# Patient Record
Sex: FEMALE | Race: WHITE
[De-identification: names, ages, dates, MRNs, and addresses within clinical notes are randomized per-mention and may not be internally consistent; named-entity substitution may affect disease eponyms.]

---

## 2020-11-27 ENCOUNTER — HOSPITAL ENCOUNTER (INPATIENT)
Dept: HOSPITAL 47 - EC | Age: 75
LOS: 3 days | DRG: 208 | End: 2020-11-30
Attending: INTERNAL MEDICINE | Admitting: INTERNAL MEDICINE
Payer: MEDICARE

## 2020-11-27 VITALS — BODY MASS INDEX: 45.5 KG/M2

## 2020-11-27 DIAGNOSIS — J44.0: ICD-10-CM

## 2020-11-27 DIAGNOSIS — I50.9: ICD-10-CM

## 2020-11-27 DIAGNOSIS — Z79.890: ICD-10-CM

## 2020-11-27 DIAGNOSIS — E66.01: ICD-10-CM

## 2020-11-27 DIAGNOSIS — Z51.5: ICD-10-CM

## 2020-11-27 DIAGNOSIS — I11.0: ICD-10-CM

## 2020-11-27 DIAGNOSIS — J12.89: ICD-10-CM

## 2020-11-27 DIAGNOSIS — J96.01: ICD-10-CM

## 2020-11-27 DIAGNOSIS — Z66: ICD-10-CM

## 2020-11-27 DIAGNOSIS — E78.5: ICD-10-CM

## 2020-11-27 DIAGNOSIS — Z79.82: ICD-10-CM

## 2020-11-27 DIAGNOSIS — Z79.899: ICD-10-CM

## 2020-11-27 DIAGNOSIS — U07.1: Primary | ICD-10-CM

## 2020-11-27 LAB
CO2 BLDA-SCNC: 28 MMOL/L (ref 19–24)
GLUCOSE BLD-MCNC: 333 MG/DL (ref 75–99)
HCO3 BLDA-SCNC: 27 MMOL/L (ref 21–25)
PCO2 BLDA: 42 MMHG (ref 35–45)
PH BLDA: 7.42 [PH] (ref 7.35–7.45)
PO2 BLDA: 97 MMHG (ref 83–108)

## 2020-11-27 PROCEDURE — 87186 SC STD MICRODIL/AGAR DIL: CPT

## 2020-11-27 PROCEDURE — 94003 VENT MGMT INPAT SUBQ DAY: CPT

## 2020-11-27 PROCEDURE — 71045 X-RAY EXAM CHEST 1 VIEW: CPT

## 2020-11-27 PROCEDURE — 99291 CRITICAL CARE FIRST HOUR: CPT

## 2020-11-27 PROCEDURE — 87205 SMEAR GRAM STAIN: CPT

## 2020-11-27 PROCEDURE — 36600 WITHDRAWAL OF ARTERIAL BLOOD: CPT

## 2020-11-27 PROCEDURE — 87077 CULTURE AEROBIC IDENTIFY: CPT

## 2020-11-27 PROCEDURE — 87070 CULTURE OTHR SPECIMN AEROBIC: CPT

## 2020-11-27 PROCEDURE — 94002 VENT MGMT INPAT INIT DAY: CPT

## 2020-11-27 PROCEDURE — 82553 CREATINE MB FRACTION: CPT

## 2020-11-27 PROCEDURE — 85379 FIBRIN DEGRADATION QUANT: CPT

## 2020-11-27 PROCEDURE — 82728 ASSAY OF FERRITIN: CPT

## 2020-11-27 PROCEDURE — 5A09357 ASSISTANCE WITH RESPIRATORY VENTILATION, LESS THAN 24 CONSECUTIVE HOURS, CONTINUOUS POSITIVE AIRWAY PRESSURE: ICD-10-PCS

## 2020-11-27 PROCEDURE — 80053 COMPREHEN METABOLIC PANEL: CPT

## 2020-11-27 PROCEDURE — 83615 LACTATE (LD) (LDH) ENZYME: CPT

## 2020-11-27 PROCEDURE — 86140 C-REACTIVE PROTEIN: CPT

## 2020-11-27 PROCEDURE — 85025 COMPLETE CBC W/AUTO DIFF WBC: CPT

## 2020-11-27 PROCEDURE — 82805 BLOOD GASES W/O2 SATURATION: CPT

## 2020-11-27 PROCEDURE — 93005 ELECTROCARDIOGRAM TRACING: CPT

## 2020-11-27 PROCEDURE — 51702 INSERT TEMP BLADDER CATH: CPT

## 2020-11-27 PROCEDURE — 83036 HEMOGLOBIN GLYCOSYLATED A1C: CPT

## 2020-11-27 PROCEDURE — 94660 CPAP INITIATION&MGMT: CPT

## 2020-11-27 RX ADMIN — DEXAMETHASONE SODIUM PHOSPHATE SCH MG: 4 INJECTION, SOLUTION INTRAMUSCULAR; INTRAVENOUS at 21:24

## 2020-11-27 RX ADMIN — ENOXAPARIN SODIUM SCH MG: 40 INJECTION SUBCUTANEOUS at 21:23

## 2020-11-27 RX ADMIN — CEFAZOLIN SCH MLS/HR: 330 INJECTION, POWDER, FOR SOLUTION INTRAMUSCULAR; INTRAVENOUS at 21:24

## 2020-11-27 NOTE — ED
General Adult HPI





- General


Chief complaint: Shortness of Breath


Stated complaint: + COVID,SOB


Time Seen by Provider: 20 18:18


Source: patient, RN/MD, EMS


Mode of arrival: EMS


Limitations: no limitations





- History of Present Illness


Initial comments: 


Dictation was produced using dragon dictation software. please excuse any 

grammatical, word or spelling errors. 





This patient was cared for during a federal and state declared state of 

emergency secondary to Covid 19





Chief Complaint: 75-year-old female sent in from [facility] for 

hypoxic respiratory failure secondary to Covid 19





History of Present Illness: 75-year-old female she was transferred from 

[facility] by Dr. Alexis.  Patient was in ER to ER transfer.  

According to transferring ER physician patient was sent here for higher level of

care.  She is one other residents at their local nursing homes.  She is 

transferred to emergency department for low saturations in the 70s and 80 

percents.  Patient is required supplemental oxygen.  At the emergency room 

patient was placed on BiPAP.  She was given 1 dose of remdesivir.  EMS reports 

that patient seemed a little wheezing and she was given a breathing treatment 

for low saturations.  The transfer patient was with CPAP.  She did have low 

oxygens.  Patient does complain of some mild epigastric pain.








The ROS documented in this emergency department record has been reviewed and 

confirmed by me.  Those systems with pertinent positive or negative responses 

have been documented in the HPI.  All other systems are other negative and/or 

noncontributory.








PHYSICAL EXAM:


General Impression: Alert and oriented x3, mildly dyspneic, BiPAP in place


HEENT: Normocephalic atraumatic, extra-ocular movements intact, pupils equal and

reactive to light bilaterally, mucous membranes moist.


Cardiovascular: Heart regular rate and rhythm


Chest: Able to complete full sentences, no retractions, no tachypnea


Abdomen: abdomen soft, non-tender, non-distended, no organomegaly


Musculoskeletal: Pulses present and equal in all extremities, no peripheral 

edema


Motor:  no focal deficits noted


Neurological: CN II-XII grossly intact, no focal motor or sensory deficits noted


Skin: Intact with no visualized rashes


Psych: Normal affect and mood





ED course: 75-year-old female presents with Covid 19 pneumonia.  Patient sent 

here for hypoxic respiratory failure possible ICU admission.  Patient is well-

appearing at bedside however she does appear to be hypoxic.  She is in the high 

80s low 90s with bilevel positive airway ventilation. 


Transfer documentation reviewed reviewed.  ABGs were ordered.  PO2 is 43, bicarb

26, CO2 of 28, pH of 7.47.  Chest x-ray shows CHF and possible right lower lobe 

pneumonia and right pleural effusion.  This is discussed that she does want to 

set patient's care.  Patient will be admitted to intensive care unit.  Case is 

discussed with Dr. Conte who is willing for patient disposition to intensive 

care unit.





EKG interpretation: Ventricular rate 80, normal sinus rhythm,.  188, , 

. No MA prolongation, no QTC prolongation, no ST or T-wave changes noted.










- Related Data


                                    Allergies











Allergy/AdvReac Type Severity Reaction Status Date / Time


 


No Known Allergies Allergy   Verified 20 18:17














Review of Systems


ROS Statement: 


Those systems with pertinent positive or pertinent negative responses have been 

documented in the HPI.





ROS Other: All systems not noted in ROS Statement are negative.





Past Medical History


Past Medical History: COPD, Hyperlipidemia, Hypertension


History of Any Multi-Drug Resistant Organisms: None Reported


Past Surgical History:  Section


Past Psychological History: No Psychological Hx Reported


Smoking Status: Never smoker


Past Alcohol Use History: None Reported


Past Drug Use History: None Reported





General Exam


Limitations: no limitations





Course


                                   Vital Signs











  20





  18:17 18:25


 


Temperature 98.6 F 98.6 F


 


Pulse Rate 79 76


 


Respiratory 36 H 24





Rate  


 


Blood Pressure 120/68 


 


O2 Sat by Pulse 88 L 91 L





Oximetry  














Medical Decision Making





- Lab Data


                                   Lab Results











  20 Range/Units





  18:28 


 


Sample Site  left radial  


 


ABG pH  7.42  (7.35-7.45)  


 


ABG pCO2  42  (35-45)  mmHg


 


ABG pO2  97  ()  mmHg


 


ABG HCO3  27 H  (21-25)  mmol/L


 


ABG Total CO2  28 H  (19-24)  mmol/L


 


ABG O2 Saturation  96.5  (94-97)  %


 


ABG Base Excess  2.7  mmol/L


 


Blair Test  Yes  


 


FiO2  100  %














Critical Care Time


Critical Care Time: Yes


Total Critical Care Time: 33





Disposition


Clinical Impression: 


 Respiratory failure, COVID-19





Disposition: ADMITTED AS IP TO THIS [facility]


Condition: Critical


Referrals: 


Nonstaff,Physician [REFERRING] - 1-2 days


Decision Time: 19:16

## 2020-11-28 LAB
ALBUMIN SERPL-MCNC: 3.3 G/DL (ref 3.5–5)
ALP SERPL-CCNC: 77 U/L (ref 38–126)
ALT SERPL-CCNC: 12 U/L (ref 4–34)
ANION GAP SERPL CALC-SCNC: 9 MMOL/L
AST SERPL-CCNC: 37 U/L (ref 14–36)
BASOPHILS # BLD AUTO: 0 K/UL (ref 0–0.2)
BASOPHILS NFR BLD AUTO: 1 %
BUN SERPL-SCNC: 31 MG/DL (ref 7–17)
CALCIUM SPEC-MCNC: 8.7 MG/DL (ref 8.4–10.2)
CHLORIDE SERPL-SCNC: 101 MMOL/L (ref 98–107)
CO2 BLDA-SCNC: 27 MMOL/L (ref 19–24)
CO2 BLDA-SCNC: 29 MMOL/L (ref 19–24)
CO2 SERPL-SCNC: 25 MMOL/L (ref 22–30)
EOSINOPHIL # BLD AUTO: 0 K/UL (ref 0–0.7)
EOSINOPHIL NFR BLD AUTO: 1 %
ERYTHROCYTE [DISTWIDTH] IN BLOOD BY AUTOMATED COUNT: 3.97 M/UL (ref 3.8–5.4)
ERYTHROCYTE [DISTWIDTH] IN BLOOD: 12.5 % (ref 11.5–15.5)
FERRITIN SERPL-MCNC: 768 NG/ML (ref 10–291)
GLUCOSE BLD-MCNC: 210 MG/DL (ref 75–99)
GLUCOSE BLD-MCNC: 250 MG/DL (ref 75–99)
GLUCOSE BLD-MCNC: 260 MG/DL (ref 75–99)
GLUCOSE SERPL-MCNC: 276 MG/DL (ref 74–99)
HBA1C MFR BLD: 7.9 % (ref 4–6)
HCO3 BLDA-SCNC: 26 MMOL/L (ref 21–25)
HCO3 BLDA-SCNC: 28 MMOL/L (ref 21–25)
HCT VFR BLD AUTO: 35.7 % (ref 34–46)
HGB BLD-MCNC: 12 GM/DL (ref 11.4–16)
LDH SPEC-CCNC: 1171 U/L (ref 313–618)
LYMPHOCYTES # SPEC AUTO: 0.8 K/UL (ref 1–4.8)
LYMPHOCYTES NFR SPEC AUTO: 16 %
MCH RBC QN AUTO: 30.3 PG (ref 25–35)
MCHC RBC AUTO-ENTMCNC: 33.6 G/DL (ref 31–37)
MCV RBC AUTO: 90 FL (ref 80–100)
MONOCYTES # BLD AUTO: 0.3 K/UL (ref 0–1)
MONOCYTES NFR BLD AUTO: 6 %
NEUTROPHILS # BLD AUTO: 3.7 K/UL (ref 1.3–7.7)
NEUTROPHILS NFR BLD AUTO: 76 %
PCO2 BLDA: 37 MMHG (ref 35–45)
PCO2 BLDA: 43 MMHG (ref 35–45)
PH BLDA: 7.42 [PH] (ref 7.35–7.45)
PH BLDA: 7.46 [PH] (ref 7.35–7.45)
PLATELET # BLD AUTO: 193 K/UL (ref 150–450)
PO2 BLDA: 207 MMHG (ref 83–108)
PO2 BLDA: 74 MMHG (ref 83–108)
POTASSIUM SERPL-SCNC: 3.9 MMOL/L (ref 3.5–5.1)
PROT SERPL-MCNC: 6.5 G/DL (ref 6.3–8.2)
SODIUM SERPL-SCNC: 135 MMOL/L (ref 137–145)
WBC # BLD AUTO: 4.9 K/UL (ref 3.8–10.6)

## 2020-11-28 PROCEDURE — 02H633Z INSERTION OF INFUSION DEVICE INTO RIGHT ATRIUM, PERCUTANEOUS APPROACH: ICD-10-PCS

## 2020-11-28 PROCEDURE — 5A1945Z RESPIRATORY VENTILATION, 24-96 CONSECUTIVE HOURS: ICD-10-PCS

## 2020-11-28 PROCEDURE — 0DH67UZ INSERTION OF FEEDING DEVICE INTO STOMACH, VIA NATURAL OR ARTIFICIAL OPENING: ICD-10-PCS

## 2020-11-28 PROCEDURE — 0BH17EZ INSERTION OF ENDOTRACHEAL AIRWAY INTO TRACHEA, VIA NATURAL OR ARTIFICIAL OPENING: ICD-10-PCS

## 2020-11-28 PROCEDURE — 4A133B1 MONITORING OF ARTERIAL PRESSURE, PERIPHERAL, PERCUTANEOUS APPROACH: ICD-10-PCS

## 2020-11-28 PROCEDURE — 03HY32Z INSERTION OF MONITORING DEVICE INTO UPPER ARTERY, PERCUTANEOUS APPROACH: ICD-10-PCS

## 2020-11-28 PROCEDURE — 4A133J1 MONITORING OF ARTERIAL PULSE, PERIPHERAL, PERCUTANEOUS APPROACH: ICD-10-PCS

## 2020-11-28 PROCEDURE — 3E0G76Z INTRODUCTION OF NUTRITIONAL SUBSTANCE INTO UPPER GI, VIA NATURAL OR ARTIFICIAL OPENING: ICD-10-PCS

## 2020-11-28 RX ADMIN — CHLORHEXIDINE GLUCONATE SCH ML: 1.2 RINSE ORAL at 20:40

## 2020-11-28 RX ADMIN — ENOXAPARIN SODIUM SCH MG: 40 INJECTION SUBCUTANEOUS at 20:40

## 2020-11-28 RX ADMIN — CHLORHEXIDINE GLUCONATE SCH ML: 1.2 RINSE ORAL at 09:26

## 2020-11-28 RX ADMIN — DEXAMETHASONE SODIUM PHOSPHATE SCH MG: 4 INJECTION, SOLUTION INTRAMUSCULAR; INTRAVENOUS at 20:40

## 2020-11-28 RX ADMIN — PANTOPRAZOLE SODIUM SCH MG: 40 INJECTION, POWDER, FOR SOLUTION INTRAVENOUS at 09:27

## 2020-11-28 RX ADMIN — INSULIN ASPART SCH UNIT: 100 INJECTION, SOLUTION INTRAVENOUS; SUBCUTANEOUS at 18:37

## 2020-11-28 RX ADMIN — SODIUM CHLORIDE SCH MLS/HR: 900 INJECTION, SOLUTION INTRAVENOUS at 19:05

## 2020-11-28 RX ADMIN — INSULIN ASPART SCH: 100 INJECTION, SOLUTION INTRAVENOUS; SUBCUTANEOUS at 17:45

## 2020-11-28 RX ADMIN — NOREPINEPHRINE BITARTRATE SCH MLS/HR: 1 INJECTION, SOLUTION, CONCENTRATE INTRAVENOUS at 10:30

## 2020-11-28 RX ADMIN — CEFAZOLIN SCH: 330 INJECTION, POWDER, FOR SOLUTION INTRAMUSCULAR; INTRAVENOUS at 18:37

## 2020-11-28 RX ADMIN — INSULIN ASPART SCH UNIT: 100 INJECTION, SOLUTION INTRAVENOUS; SUBCUTANEOUS at 06:26

## 2020-11-28 NOTE — XR
EXAMINATION TYPE: XR chest 1V portable

 

DATE OF EXAM: 11/28/2020

 

CLINICAL HISTORY: Central line placement.  

 

TECHNIQUE: Single AP portable semiupright view of the chest is obtained.

 

COMPARISON: Chest x-ray from earlier today an older studies

 

FINDINGS:  New right sided central venous catheter terminates in right atrium. Stable endotracheal an
d orogastric tubes.

 

Persistent elevated right hemidiaphragm with increased opacities bilaterally. No pleural effusion or 
pneumothorax seen. Enlarged cardiomediastinal silhouette redemonstrated. Osseous structures remain de
mineralized.

 

IMPRESSION: New right central venous catheter terminates in right atrium. No pneumothorax noted. Pers
istent low lung volumes and cardiomegaly with bilateral multifocal edema and/or infiltrates.

## 2020-11-28 NOTE — PCN
PROCEDURE NOTE



OPERATIVE REPORT:

Placement of the left brachial arterial line.



PREOPERATIVE DIAGNOSIS:

Acute hypoxic respiratory failure secondary to Covid-19 pneumonitis.



POSTOPERATIVE DIAGNOSIS:

Acute hypoxic respiratory failure secondary to Covid-19 pneumonitis.



ANESTHESIA:

Used none deployed.



PROCEDURE DETAILS:

The left brachial region was prepared in a sterile fashion and drapes applied.  The

left brachial artery was palpated, easily cannulated, a guidewire was placed.  A Cook

catheter was inserted over the guidewire, guidewire was removed.  Good blood flow, good

waveform noted, line was secured using 3.0 silk sutures, and no evidence of any

complications.





MMODL / IJN: [id number] / Job#: [id number]

## 2020-11-28 NOTE — XR
EXAM:

  XR Chest, 1 View

 

CLINICAL HISTORY:

  ITS.REASON XR Reason: Tube placement

 

TECHNIQUE:

  Frontal view of the chest.

 

COMPARISON:

 

11/27/2020

 

IMPRESSION:     

 

ET tube terminates 2.9 cm from the case.

NG tube enters into the stomach but projects off the field of view.

Bilateral lung opacities are again seen.

Subcentimeter nodular densities in the right upper lobe

## 2020-11-28 NOTE — PCN
PROCEDURE NOTE



OPERATIVE REPORT:

Placement of a right subclavian triple-lumen catheter.



PREOPERATIVE DIAGNOSIS:

Acute hypoxic respiratory failure secondary to COVID-19 pneumonitis.



POSTOPERATIVE DIAGNOSIS:

Acute hypoxic respiratory failure secondary to COVID-19 pneumonitis.



ANESTHESIA USED:

2 mL of 1% lidocaine.



PROCEDURE DETAILS:

Patient was placed in a Trendelenburg position, the area of the right subclavian region

was prepared in a sterile fashion and drapes were applied.  Then, the area was locally

anesthetized with lidocaine.  Using the infraclavicular approach, the right subclavian

vein was easily cannulated, a guidewire was placed, the area around the guidewire was

dilated.  Then a triple-lumen catheter was inserted over the guidewire, the guidewire

was removed.  Good blood flow noted in the 3 different ports of the triple-lumen

catheter.  Line was secured using 3.0 silk sutures.  No evidence of any complications.

Chest x-ray showed adequate placement of the right subclavian line and no

complications.





MMODL / IJN: [id number] / Job#: [id number]

## 2020-11-28 NOTE — P.CNPUL
History of Present Illness


Consult date: 20


Requesting physician: Tony E Sheet


Reason for consult: other (Acute hypoxic respiratory failure)


Chief complaint: Shortness of breath


History of present illness: 





This is a 75-year-old female transferred last night from [facility] to

our ER with acute hypoxic respiratory failure, and bilateral pneumonia.  Patient

tested positive for covid 19, and her chest x-ray is classic for Covid 19 

pneumonitis.  Patient was initially on BiPAP, and her initial ABG was extremely 

poor.  Patient was admitted to the ICU, and she Removing her BiPAP.  Hence she 

was intubated and placed on mechanical ventilation.  I saw this patient this 

morning, and she is now on assist control mode of mechanical ventilation, rate 

of 2412 volume is 450 FiO2 is 60% and PEEP of 10.  ABG on 100% FiO2 showed a pO2

of 207 pCO2 of 37 pH of 7.46.  Patient is on propofol at 50 mcg/kg/m, she also 

on fentanyl at 0.5 mcg/kg per hour.  She received 1 dose of remdesivir at 

[facility], I was considering to continue the medication, however 

since her CODE STATUS has been changed, and the daughter is considering possible

comfort care measures, will hold further treatment with remdesivir.  Chest x-ray

showed evidence of diffuse interstitial infiltrates.





Review of Systems


ROS unobtainable: due to endotracheal tube





Past Medical History


Past Medical History: COPD, Hyperlipidemia, Hypertension


History of Any Multi-Drug Resistant Organisms: None Reported


Past Surgical History:  Section


Past Anesthesia/Blood Transfusion Reactions: No Reported Reaction


Smoking Status: Never smoker





Medications and Allergies


                                Home Medications











 Medication  Instructions  Recorded  Confirmed  Type


 


Acetaminophen [Tylenol] 650 mg PO Q6H PRN 20 History


 


Artificial Tears-Hypromellose 1 drops BOTH EYES BID 20 History





[Artificial Tear Drops]    


 


Ascorbic Acid [Vitamin C] 500 mg PO DAILY 20 History


 


Aspirin [Adult Low Dose Aspirin EC] 81 mg PO DAILY 20 History


 


Atorvastatin [Lipitor] 10 mg PO HS 20 History


 


Azithromycin [Zithromax Z-pack (6 See Taper PO AS DIRECTED 20 

History





tabs)]    


 


Carvedilol [Coreg] 12.5 mg PO BID 20 History


 


Cholecalciferol [Vitamin D3 (25 5,000 unit PO DAILY 20 History





Mcg = 1000 Iu)]    


 


DULoxetine HCL [Cymbalta] 60 mg PO DAILY 20 History


 


Dexamethasone 6 mg PO DAILY 20 History


 


Diphenox-Atrop 2.5-0.025 mg 1 tab PO QID PRN 20 History





[Lomotil]    


 


Furosemide [Lasix] 20 mg PO DAILY 20 History


 


Gabapentin [Neurontin] 100 mg PO BID 20 History


 


HYDROcodone/APAP 5-325MG [Norco 1 tab PO QID 20 History





5-325]    


 


LORazepam [Ativan] 1 mg PO Q2H PRN 20 History


 


Levothyroxine Sodium [Synthroid] 50 mcg PO DAILY 20 History


 


Magnesium Hydroxide [Milk of 2,400 mg PO Q48H PRN 20 History





Magnesia]    


 


Mirtazapine [Remeron] 15 mg PO HS 20 History


 


Pantoprazole Sodium [Protonix] 20 mg PO DAILY 20 History


 


Potassium Chloride ER [K-Dur 10] 10 meq PO DAILY 20 History


 


Sennosides-Docusate Sodium 1 tab PO HS 20 History





[Senokot-S]    


 


Zinc 50 mg PO DAILY 20 History


 


lisinopriL [Zestril] 2.5 mg PO DAILY 20 History








                                    Allergies











Allergy/AdvReac Type Severity Reaction Status Date / Time


 


No Known Allergies Allergy   Verified 20 22:15














Physical Exam


Vitals: 


                                   Vital Signs











  Temp Pulse Resp BP Pulse Ox


 


 20 12:00   66  24   91 L


 


 20 11:00   66  24  70/53  87 L


 


 20 10:00   70  24  93/60  92 L


 


 20 09:00   70  24  88/59  92 L


 


 20 08:00   74  24  97/63  91 L


 


 20 07:00   73  24  94/61  90 L


 


 20 06:00   69  25 H  102/66  98


 


 20 05:00   74  24  91/60  99


 


 20 04:00  98.5 F  82  26 H  166/87  77 L


 


 20 03:00   63  43 H  106/89  91 L


 


 20 02:00   69  26 H  147/88  94 L


 


 20 01:00   68  28 H  150/82  94 L


 


 20 00:00   67  36 H  150/82  93 L


 


 20 23:45  98.7 F  71  30 H  166/109  94 L


 


 20 23:00  99.0 F  75  32 H  124/77  98


 


 20 22:00  98.0 F  89  34 H  116/87  95


 


 20 21:30   57 L  28 H  138/84  95


 


 20 21:00   72  30 H  131/77  95


 


 20 19:23   69  30 H  121/89  94 L


 


 20 18:25  98.6 F  76  24   91 L


 


 20 18:17  98.6 F  79  36 H  120/68  88 L








                                Intake and Output











 20





 06:59 14:59 22:59


 


Intake Total 232.584 162.661 


 


Output Total 315 55 


 


Balance -82.416 107.661 


 


Intake:   


 


   60 


 


    Sodium Chloride 0.9% 1, 160 60 





    000 ml @ 20 mls/hr IV .   





    Q24H ELINA Rx#:[id number]   


 


  Intake, IV Titration 72.584 102.661 





  Amount   


 


    Norepinephrine 8 mg In  2.661 





    Sodium Chloride 0.9% 250   





    ml @ 0.05 MCG/KG/MIN 10.   





    643 mls/hr IV .Q24H ELINA   





    Rx#:[id number]   


 


    propofoL 1,000 mg In 72.584 100 





    Empty Bag 1 bag @ Titrate   





    IV .Q0M ELINA Rx#:   





    [id number]   


 


Output:   


 


  Urine 315 55 


 


Other:   


 


  Voiding Method Indwelling Catheter  


 


  Weight 110 kg 110 kg 








                         ABP, PAP, CO, CI - Last 8 Hours











Arterial Blood Pressure        105/59


 


Arterial Blood Pressure        60/37

















Physical Exam: Revealed a 75-year-old female, morbidly obese, intubated and 

mechanically ventilated.


Head: Atraumatic, normocephalic.  Endotracheal tube, orogastric tube are intact.

  Right subclavian central line is noted.


HEENT:[Neck is supple.] [No neck masses.] [No thyromegaly.] [No JVD.]


Chest: Symmetrical chest expansion, crackles and rhonchi noted bilaterally.


Cardiac Exam: [Normal S1 and S2, no S3 gallop, no murmur.]


Abdomen: [Morbidly obese, Soft, nontender,  no megaly, no rebound, no guarding, 

normal bowel sounds.]


Extremities: [No clubbing, no edema, no cyanosis.]  Good pulses bilaterally


Neurological Exam: Could not assess, patient is on propofol and fentanyl.


Psychiatric could not assess 





Results





- Laboratory Findings


CBC and BMP: 


                                 20 05:34





                                 20 05:34


ABG











ABG pH  7.46  (7.35-7.45)  H  20  06:05    


 


ABG pCO2  37 mmHg (35-45)   20  06:05    


 


ABG pO2  207 mmHg ()  H  20  06:05    


 


ABG O2 Saturation  98.7 % (94-97)  H  20  06:05    





PT/INR, D-dimer











D-Dimer  0.75 mg/L FEU (<0.60)  H  20  05:34    








Abnormal lab findings: 


                                  Abnormal Labs











  20





  18:28 23:49 00:35


 


Lymphocytes #   


 


D-Dimer   


 


ABG pH   


 


ABG pO2    74 L


 


ABG HCO3  27 H   28 H


 


ABG Total CO2  28 H   29 H


 


ABG O2 Saturation   


 


Sodium   


 


BUN   


 


Glucose   


 


POC Glucose (mg/dL)   333 H 


 


Hemoglobin A1c   


 


Ferritin   


 


AST   


 


Lactate Dehydrogenase   


 


C-Reactive Protein   


 


Albumin   














  20





  05:34 05:34 05:34


 


Lymphocytes #  0.8 L  


 


D-Dimer   0.75 H 


 


ABG pH   


 


ABG pO2   


 


ABG HCO3   


 


ABG Total CO2   


 


ABG O2 Saturation   


 


Sodium    135 L


 


BUN    31 H


 


Glucose    276 H


 


POC Glucose (mg/dL)   


 


Hemoglobin A1c   


 


Ferritin    768.0 H


 


AST    37 H


 


Lactate Dehydrogenase    1171 H


 


C-Reactive Protein    204.7 H


 


Albumin    3.3 L














  20





  05:34 06:01 06:05


 


Lymphocytes #   


 


D-Dimer   


 


ABG pH    7.46 H


 


ABG pO2    207 H


 


ABG HCO3    26 H


 


ABG Total CO2    27 H


 


ABG O2 Saturation    98.7 H


 


Sodium   


 


BUN   


 


Glucose   


 


POC Glucose (mg/dL)   260 H 


 


Hemoglobin A1c  7.9 H  


 


Ferritin   


 


AST   


 


Lactate Dehydrogenase   


 


C-Reactive Protein   


 


Albumin   














- Diagnostic Findings


Chest x-ray: image reviewed (As noted in HPI.)





Assessment and Plan


Assessment: 





Impression:


Acute hypoxic respiratory failure secondary to Covid 19 pneumonitis.


History of benign essential hypertension.


History of dyslipidemia.


History of underlying COPD, severity of which is not clear.





Recommendation:


Continue ventilatory support.


Continue Covid 19 cocktail.


GI and DVT prophylaxis.


Nutritional support/enteral feeding.


Hemodynamic support if necessary.


Prognosis is definitely guarded.


adjust ventilator settings accordingly based on the next ABG.


Continue propofol and fentanyl for now.


Apparently the nurse taking care of the patient had discussion with her 

daughter, and CODE STATUS has been all along DO NOT RESUSCITATE, daughter is 

considering comfort care measures.


Will recommend the wishes of the daughter , we will proceed with comfort care 

measures if the daughter wishes and if those are true wishes of the patient 

prior to this episode.





Time with Patient: Greater than 30

## 2020-11-29 LAB
ALBUMIN SERPL-MCNC: 3.1 G/DL (ref 3.5–5)
ALP SERPL-CCNC: 75 U/L (ref 38–126)
ALT SERPL-CCNC: 13 U/L (ref 4–34)
ANION GAP SERPL CALC-SCNC: 9 MMOL/L
AST SERPL-CCNC: 31 U/L (ref 14–36)
BASOPHILS # BLD AUTO: 0.1 K/UL (ref 0–0.2)
BASOPHILS NFR BLD AUTO: 1 %
BUN SERPL-SCNC: 40 MG/DL (ref 7–17)
CALCIUM SPEC-MCNC: 8.7 MG/DL (ref 8.4–10.2)
CHLORIDE SERPL-SCNC: 100 MMOL/L (ref 98–107)
CO2 BLDA-SCNC: 27 MMOL/L (ref 19–24)
CO2 SERPL-SCNC: 27 MMOL/L (ref 22–30)
EOSINOPHIL # BLD AUTO: 0 K/UL (ref 0–0.7)
EOSINOPHIL NFR BLD AUTO: 0 %
ERYTHROCYTE [DISTWIDTH] IN BLOOD BY AUTOMATED COUNT: 3.85 M/UL (ref 3.8–5.4)
ERYTHROCYTE [DISTWIDTH] IN BLOOD: 12.5 % (ref 11.5–15.5)
FERRITIN SERPL-MCNC: 635.6 NG/ML (ref 10–291)
GLUCOSE BLD-MCNC: 193 MG/DL (ref 75–99)
GLUCOSE BLD-MCNC: 243 MG/DL (ref 75–99)
GLUCOSE BLD-MCNC: 254 MG/DL (ref 75–99)
GLUCOSE SERPL-MCNC: 243 MG/DL (ref 74–99)
HCO3 BLDA-SCNC: 26 MMOL/L (ref 21–25)
HCT VFR BLD AUTO: 34.1 % (ref 34–46)
HGB BLD-MCNC: 11.6 GM/DL (ref 11.4–16)
LDH SPEC-CCNC: 844 U/L (ref 313–618)
LYMPHOCYTES # SPEC AUTO: 0.6 K/UL (ref 1–4.8)
LYMPHOCYTES NFR SPEC AUTO: 10 %
MCH RBC QN AUTO: 30.2 PG (ref 25–35)
MCHC RBC AUTO-ENTMCNC: 34.1 G/DL (ref 31–37)
MCV RBC AUTO: 88.6 FL (ref 80–100)
MONOCYTES # BLD AUTO: 0.4 K/UL (ref 0–1)
MONOCYTES NFR BLD AUTO: 6 %
NEUTROPHILS # BLD AUTO: 5.2 K/UL (ref 1.3–7.7)
NEUTROPHILS NFR BLD AUTO: 82 %
PCO2 BLDA: 33 MMHG (ref 35–45)
PH BLDA: 7.51 [PH] (ref 7.35–7.45)
PLATELET # BLD AUTO: 204 K/UL (ref 150–450)
PO2 BLDA: 72 MMHG (ref 83–108)
POTASSIUM SERPL-SCNC: 3.5 MMOL/L (ref 3.5–5.1)
PROT SERPL-MCNC: 6.3 G/DL (ref 6.3–8.2)
SODIUM SERPL-SCNC: 136 MMOL/L (ref 137–145)
WBC # BLD AUTO: 6.4 K/UL (ref 3.8–10.6)

## 2020-11-29 RX ADMIN — INSULIN ASPART SCH UNIT: 100 INJECTION, SOLUTION INTRAVENOUS; SUBCUTANEOUS at 06:19

## 2020-11-29 RX ADMIN — CHLORHEXIDINE GLUCONATE SCH ML: 1.2 RINSE ORAL at 08:43

## 2020-11-29 RX ADMIN — INSULIN ASPART SCH UNIT: 100 INJECTION, SOLUTION INTRAVENOUS; SUBCUTANEOUS at 18:13

## 2020-11-29 RX ADMIN — CHLORHEXIDINE GLUCONATE SCH ML: 1.2 RINSE ORAL at 20:59

## 2020-11-29 RX ADMIN — CEFAZOLIN SCH MLS/HR: 330 INJECTION, POWDER, FOR SOLUTION INTRAMUSCULAR; INTRAVENOUS at 20:59

## 2020-11-29 RX ADMIN — POTASSIUM BICARBONATE SCH MEQ: 782 TABLET, EFFERVESCENT ORAL at 08:43

## 2020-11-29 RX ADMIN — POTASSIUM BICARBONATE SCH MEQ: 782 TABLET, EFFERVESCENT ORAL at 06:39

## 2020-11-29 RX ADMIN — ENOXAPARIN SODIUM SCH MG: 40 INJECTION SUBCUTANEOUS at 20:59

## 2020-11-29 RX ADMIN — DEXAMETHASONE SODIUM PHOSPHATE SCH MG: 4 INJECTION, SOLUTION INTRAMUSCULAR; INTRAVENOUS at 20:59

## 2020-11-29 RX ADMIN — NOREPINEPHRINE BITARTRATE SCH: 1 INJECTION, SOLUTION, CONCENTRATE INTRAVENOUS at 13:01

## 2020-11-29 RX ADMIN — SODIUM CHLORIDE SCH MLS/HR: 900 INJECTION, SOLUTION INTRAVENOUS at 08:41

## 2020-11-29 RX ADMIN — INSULIN ASPART SCH UNIT: 100 INJECTION, SOLUTION INTRAVENOUS; SUBCUTANEOUS at 00:00

## 2020-11-29 RX ADMIN — PANTOPRAZOLE SODIUM SCH MG: 40 INJECTION, POWDER, FOR SOLUTION INTRAVENOUS at 08:43

## 2020-11-29 RX ADMIN — INSULIN ASPART SCH UNIT: 100 INJECTION, SOLUTION INTRAVENOUS; SUBCUTANEOUS at 13:02

## 2020-11-29 NOTE — P.PN
Subjective


Progress Note Date: 11/29/20


Principal diagnosis: 





Acute hypoxic respiratory failure secondary to covid19 pneumonitis.





This is a 75-year-old female transferred last night from [facility] to

our ER with acute hypoxic respiratory failure, and bilateral pneumonia.  Patient

tested positive for covid 19, and her chest x-ray is classic for Covid 19 

pneumonitis.  Patient was initially on BiPAP, and her initial ABG was extremely 

poor.  Patient was admitted to the ICU, and she Removing her BiPAP.  Hence she 

was intubated and placed on mechanical ventilation.  I saw this patient this 

morning, and she is now on assist control mode of mechanical ventilation, rate 

of 2412 volume is 450 FiO2 is 60% and PEEP of 10.  ABG on 100% FiO2 showed a pO2

of 207 pCO2 of 37 pH of 7.46.  Patient is on propofol at 50 mcg/kg/m, she also 

on fentanyl at 0.5 mcg/kg per hour.  She received 1 dose of remdesivir at [facility], I was considering to continue the medication, however since her

CODE STATUS has been changed, and the daughter is considering possible comfort 

care measures, will hold further treatment with remdesivir.  Chest x-ray showed 

evidence of diffuse interstitial infiltrates.





Patient was reevaluated today on 11/29/20, remains in the ICU, intubated and 

mechanically ventilated.  Her ventilator settings are assist control rate of 24 

FiO2 is 60% tidal volume is 453 is at 10.  ABG showed a pO2 of 72 pCO2 of 33 pH 

of 7.51.  Her peak airway pressure is 32 and her static pressure is 29.  Patient

is requiring very minimal dose of norepinephrine 0.003 mcg/kg/m.  Remains on 

fentanyl at 0.5 mcg/kg/h, I cut it down to 0.25 remains on propofol at 40 

mcg/kg/m.  She is on enteral feeding vital HP 17/17.  FiO2 was cut down to 50% 

and set of 60%.  Kept on the same PEEP of 10.  Chest x-ray continues to show 

diffuse bilateral infiltrates, consistent with Covid 19 pneumonitis CBC is 

relatively normal left lites are normal renal profile is normal LDH is 844 C-

reactive protein is 161.  Ferritin is 635.











Objective





- Vital Signs


Vital signs: 


                                   Vital Signs











Temp  97.7 F   11/29/20 04:00


 


Pulse  53 L  11/29/20 10:00


 


Resp  24   11/29/20 10:00


 


BP  114/69   11/29/20 07:00


 


Pulse Ox  94 L  11/29/20 10:00








                                 Intake & Output











 11/28/20 11/29/20 11/29/20





 18:59 06:59 18:59


 


Intake Total 456.209 708.001 211.852


 


Output Total 375 362 35


 


Balance 81.209 346.001 176.852


 


Weight 110 kg 112.3 kg 


 


Intake:   


 


   240 20


 


    Sodium Chloride 0.9% 1, 220 240 20





    000 ml @ 20 mls/hr IV .   





    Q24H ELINA Rx#:[id number]   


 


  Intake, IV Titration 236.209 208.001 174.852





  Amount   


 


    Norepinephrine 8 mg In 34.659 10.840 7.359





    Sodium Chloride 0.9% 250   





    ml @ 0.05 MCG/KG/MIN 10.   





    643 mls/hr IV .Q24H ELINA   





    Rx#:[id number]   


 


    fentaNYL (PF) 1,000 mcg 1.55  





    In Sodium Chloride 0.9%   





    80 ml @ Per Protocol IV .   





    Q0M ELINA Rx#:[id number]   


 


    fentaNYL (PF) 1,000 mcg   84.333





    In Sodium Chloride 0.9%   





    80 ml @ Per Protocol IV .   





    Q0M ELINA Rx#:[id number]   


 


    propofoL 1,000 mg In 200 197.161 83.16





    Empty Bag 1 bag @ Titrate   





    IV .Q0M ELINA Rx#:   





    [id number]   


 


  Tube Feeding  170 17


 


  Other  90 


 


Output:   


 


  Urine 375 362 35


 


Other:   


 


  Voiding Method Indwelling Catheter Indwelling Catheter Indwelling Catheter


 


  # Bowel Movements   0








                       ABP, PAP, CO, CI - Last Documented











Arterial Blood Pressure        106/56

















- Exam


Physical Exam: Revealed a 75-year-old female, morbidly obese, intubated and 

mechanically ventilated.


Head: Atraumatic, normocephalic.  Endotracheal tube, orogastric tube are intact.

 Right subclavian central line is noted.


HEENT:[Neck is supple.] [No neck masses.] [No thyromegaly.] [No JVD.]


Chest: Symmetrical chest expansion, crackles and rhonchi noted bilaterally.


Cardiac Exam: [Normal S1 and S2, no S3 gallop, no murmur.]


Abdomen: [Morbidly obese, Soft, nontender,  no megaly, no rebound, no guarding, 

normal bowel sounds.]


Extremities: [No clubbing, no edema, no cyanosis.]  Good pulses bilaterally


Neurological Exam: Could not assess, patient is on propofol and fentanyl.


Psychiatric could not assess 











- Labs


CBC & Chem 7: 


                                 11/29/20 04:02





                                 11/29/20 04:02


Labs: 


                  Abnormal Lab Results - Last 24 Hours (Table)











  11/28/20 11/28/20 11/29/20 Range/Units





  18:34 23:53 04:02 


 


Lymphocytes #    0.6 L  (1.0-4.8)  k/uL


 


ABG pH     (7.35-7.45)  


 


ABG pCO2     (35-45)  mmHg


 


ABG pO2     ()  mmHg


 


ABG HCO3     (21-25)  mmol/L


 


ABG Total CO2     (19-24)  mmol/L


 


Sodium     (137-145)  mmol/L


 


BUN     (7-17)  mg/dL


 


Glucose     (74-99)  mg/dL


 


POC Glucose (mg/dL)  250 H  210 H   (75-99)  mg/dL


 


Ferritin     (10.0-291.0)  ng/mL


 


Lactate Dehydrogenase     (313-618)  U/L


 


C-Reactive Protein     (<10.0)  mg/L


 


Albumin     (3.5-5.0)  g/dL














  11/29/20 11/29/20 11/29/20 Range/Units





  04:02 05:16 05:30 


 


Lymphocytes #     (1.0-4.8)  k/uL


 


ABG pH   7.51 H   (7.35-7.45)  


 


ABG pCO2   33 L   (35-45)  mmHg


 


ABG pO2   72 L   ()  mmHg


 


ABG HCO3   26 H   (21-25)  mmol/L


 


ABG Total CO2   27 H   (19-24)  mmol/L


 


Sodium  136 L    (137-145)  mmol/L


 


BUN  40 H    (7-17)  mg/dL


 


Glucose  243 H    (74-99)  mg/dL


 


POC Glucose (mg/dL)    243 H  (75-99)  mg/dL


 


Ferritin  635.6 H    (10.0-291.0)  ng/mL


 


Lactate Dehydrogenase  844 H    (313-618)  U/L


 


C-Reactive Protein  161.8 H    (<10.0)  mg/L


 


Albumin  3.1 L    (3.5-5.0)  g/dL














  11/29/20 Range/Units





  12:17 


 


Lymphocytes #   (1.0-4.8)  k/uL


 


ABG pH   (7.35-7.45)  


 


ABG pCO2   (35-45)  mmHg


 


ABG pO2   ()  mmHg


 


ABG HCO3   (21-25)  mmol/L


 


ABG Total CO2   (19-24)  mmol/L


 


Sodium   (137-145)  mmol/L


 


BUN   (7-17)  mg/dL


 


Glucose   (74-99)  mg/dL


 


POC Glucose (mg/dL)  254 H  (75-99)  mg/dL


 


Ferritin   (10.0-291.0)  ng/mL


 


Lactate Dehydrogenase   (313-618)  U/L


 


C-Reactive Protein   (<10.0)  mg/L


 


Albumin   (3.5-5.0)  g/dL








                      Microbiology - Last 24 Hours (Table)











 11/28/20 22:25 Gram Stain - Preliminary





 Sputum Sputum Culture - Preliminary














Assessment and Plan


Assessment: 





Impression:


Acute hypoxic respiratory failure secondary to Covid 19 pneumonitis.


History of benign essential hypertension.


History of dyslipidemia.


History of underlying COPD, severity of which is not clear.





Recommendation:


Continue ventilatory support.


Continue Covid 19 cocktail.


GI and DVT prophylaxis.


Nutritional support/enteral feeding.


Hemodynamic support requiring minimal dose of norepinephrine will likely 

discontinue today.


Continue Lovenox.  


Continue propofol and fentanyl for now.


Continue DO NOT RESUSCITATE CODE STATUS.


We'll follow.


Critical care time is over 30 minutes





Time with Patient: Greater than 30

## 2020-11-29 NOTE — P.HPIM
History of Present Illness





This is a pleasant 75 years old female who was transferred from [facility] for bilateral covid Pneumonia as her test came back positive she's also

with acute hypoxic respiratory failure initially she was on BiPAP but later on 

she needed intubation and placed on mechanical ventilation and was transferred 

to the intensive care unit under the care and close monitoring of the 

pulmonary/critical care team, patient could not provide information and were 

obtained from records


Patient currently is on dexamethasone and supportive care and medication


Patient status changed to DO NOT RESUSCITATE per daughter request labs and 

vitals are reviewed








Review of Systems





N/a, patient is intubated





Past Medical History


Past Medical History: COPD, Hyperlipidemia, Hypertension


History of Any Multi-Drug Resistant Organisms: None Reported


Past Surgical History:  Section


Past Anesthesia/Blood Transfusion Reactions: No Reported Reaction


Smoking Status: Never smoker





Medications and Allergies


                                Home Medications











 Medication  Instructions  Recorded  Confirmed  Type


 


Acetaminophen [Tylenol] 650 mg PO Q6H PRN 20 History


 


Artificial Tears-Hypromellose 1 drops BOTH EYES BID 20 History





[Artificial Tear Drops]    


 


Ascorbic Acid [Vitamin C] 500 mg PO DAILY 20 History


 


Aspirin [Adult Low Dose Aspirin EC] 81 mg PO DAILY 20 History


 


Atorvastatin [Lipitor] 10 mg PO HS 20 History


 


Azithromycin [Zithromax Z-pack (6 See Taper PO AS DIRECTED 20 

History





tabs)]    


 


Carvedilol [Coreg] 12.5 mg PO BID 20 History


 


Cholecalciferol [Vitamin D3 (25 5,000 unit PO DAILY 20 History





Mcg = 1000 Iu)]    


 


DULoxetine HCL [Cymbalta] 60 mg PO DAILY 20 History


 


Dexamethasone 6 mg PO DAILY 20 History


 


Diphenox-Atrop 2.5-0.025 mg 1 tab PO QID PRN 20 History





[Lomotil]    


 


Furosemide [Lasix] 20 mg PO DAILY 20 History


 


Gabapentin [Neurontin] 100 mg PO BID 20 History


 


HYDROcodone/APAP 5-325MG [Norco 1 tab PO QID 20 History





5-325]    


 


LORazepam [Ativan] 1 mg PO Q2H PRN 20 History


 


Levothyroxine Sodium [Synthroid] 50 mcg PO DAILY 20 History


 


Magnesium Hydroxide [Milk of 2,400 mg PO Q48H PRN 20 History





Magnesia]    


 


Mirtazapine [Remeron] 15 mg PO HS 20 History


 


Pantoprazole Sodium [Protonix] 20 mg PO DAILY 20 History


 


Potassium Chloride ER [K-Dur 10] 10 meq PO DAILY 20 History


 


Sennosides-Docusate Sodium 1 tab PO HS 20 History





[Senokot-S]    


 


Zinc 50 mg PO DAILY 20 History


 


lisinopriL [Zestril] 2.5 mg PO DAILY 20 History








                                    Allergies











Allergy/AdvReac Type Severity Reaction Status Date / Time


 


No Known Allergies Allergy   Verified 20 22:15














Physical Exam


Vitals: 


                                   Vital Signs











  Temp Pulse Resp BP Pulse Ox


 


 20 12:00   66  24   91 L


 


 20 11:00   66  24  70/53  87 L


 


 20 10:00   70  24  93/60  92 L


 


 20 09:00   70  24  88/59  92 L


 


 20 08:00   74  24  97/63  91 L


 


 20 07:00   73  24  94/61  90 L


 


 20 06:00   69  25 H  102/66  98


 


 20 05:00   74  24  91/60  99


 


 20 04:00  98.5 F  82  26 H  166/87  77 L


 


 20 03:00   63  43 H  106/89  91 L


 


 20 02:00   69  26 H  147/88  94 L


 


 20 01:00   68  28 H  150/82  94 L


 


 20 00:00   67  36 H  150/82  93 L


 


 20 23:45  98.7 F  71  30 H  166/109  94 L


 


 20 23:00  99.0 F  75  32 H  124/77  98


 


 20 22:00  98.0 F  89  34 H  116/87  95


 


 20 21:30   57 L  28 H  138/84  95


 


 20 21:00   72  30 H  131/77  95


 


 20 19:23   69  30 H  121/89  94 L


 


 20 18:25  98.6 F  76  24   91 L


 


 20 18:17  98.6 F  79  36 H  120/68  88 L








                                Intake and Output











 20





 22:59 06:59 14:59


 


Intake Total  232.584 162.661


 


Output Total  315 55


 


Balance  -82.416 107.661


 


Intake:   


 


  IV  160 60


 


    Sodium Chloride 0.9% 1,  160 60





    000 ml @ 20 mls/hr IV .   





    Q24H ELINA Rx#:[id number]   


 


  Intake, IV Titration  72.584 102.661





  Amount   


 


    Norepinephrine 8 mg In   2.661





    Sodium Chloride 0.9% 250   





    ml @ 0.05 MCG/KG/MIN 10.   





    643 mls/hr IV .Q24H ELINA   





    Rx#:[id number]   


 


    propofoL 1,000 mg In  72.584 100





    Empty Bag 1 bag @ Titrate   





    IV .Q0M ELINA Rx#:   





    [id number]   


 


Output:   


 


  Urine  315 55


 


Other:   


 


  Voiding Method  Indwelling Catheter 


 


  Weight 114.305 kg 110 kg 110 kg








                         ABP, PAP, CO, CI - Last 8 Hours











Arterial Blood Pressure        105/59


 


Arterial Blood Pressure        60/37

















-GENERAL: The patient is intubated and sedated


HEENT: Pupils are round and equally reacting to light. EOMI. No scleral icterus.

 No conjunctival pallor. Normocephalic, atraumatic. No pharyngeal erythema. No 

thyromegaly. 


CARDIOVASCULAR: S1 and S2 present. No murmurs, rubs, or gallops. 


-PULMONARY: Chest is clear to auscultation, no wheezing .  Tachypnea with 

bilateral


ABDOMEN: Soft, nontender, nondistended, normoactive bowel sounds. No palpable 

organomegaly. 


MUSCULOSKELETAL: No joint swelling or deformity. 


EXTREMITIES: No cyanosis, clubbing, or pedal edema. 


NEUROLOGICAL: Gross neurological examination did not reveal any focal deficits. 


SKIN: No rashes. no petechiae.





Results


CBC & Chem 7: 


                                 20 04:02





                                 20 05:34


Labs: 


                  Abnormal Lab Results - Last 24 Hours (Table)











  20 Range/Units





  18:28 23:49 00:35 


 


Lymphocytes #     (1.0-4.8)  k/uL


 


D-Dimer     (<0.60)  mg/L FEU


 


ABG pH     (7.35-7.45)  


 


ABG pO2    74 L  ()  mmHg


 


ABG HCO3  27 H   28 H  (21-25)  mmol/L


 


ABG Total CO2  28 H   29 H  (19-24)  mmol/L


 


ABG O2 Saturation     (94-97)  %


 


Sodium     (137-145)  mmol/L


 


BUN     (7-17)  mg/dL


 


Glucose     (74-99)  mg/dL


 


POC Glucose (mg/dL)   333 H   (75-99)  mg/dL


 


Ferritin     (10.0-291.0)  ng/mL


 


AST     (14-36)  U/L


 


Lactate Dehydrogenase     (313-618)  U/L


 


C-Reactive Protein     (<10.0)  mg/L


 


Albumin     (3.5-5.0)  g/dL














  20 Range/Units





  05:34 05:34 05:34 


 


Lymphocytes #  0.8 L    (1.0-4.8)  k/uL


 


D-Dimer   0.75 H   (<0.60)  mg/L FEU


 


ABG pH     (7.35-7.45)  


 


ABG pO2     ()  mmHg


 


ABG HCO3     (21-25)  mmol/L


 


ABG Total CO2     (19-24)  mmol/L


 


ABG O2 Saturation     (94-97)  %


 


Sodium    135 L  (137-145)  mmol/L


 


BUN    31 H  (7-17)  mg/dL


 


Glucose    276 H  (74-99)  mg/dL


 


POC Glucose (mg/dL)     (75-99)  mg/dL


 


Ferritin    768.0 H  (10.0-291.0)  ng/mL


 


AST    37 H  (14-36)  U/L


 


Lactate Dehydrogenase    1171 H  (313-618)  U/L


 


C-Reactive Protein    204.7 H  (<10.0)  mg/L


 


Albumin    3.3 L  (3.5-5.0)  g/dL














  20 Range/Units





  06:01 06:05 


 


Lymphocytes #    (1.0-4.8)  k/uL


 


D-Dimer    (<0.60)  mg/L FEU


 


ABG pH   7.46 H  (7.35-7.45)  


 


ABG pO2   207 H  ()  mmHg


 


ABG HCO3   26 H  (21-25)  mmol/L


 


ABG Total CO2   27 H  (19-24)  mmol/L


 


ABG O2 Saturation   98.7 H  (94-97)  %


 


Sodium    (137-145)  mmol/L


 


BUN    (7-17)  mg/dL


 


Glucose    (74-99)  mg/dL


 


POC Glucose (mg/dL)  260 H   (75-99)  mg/dL


 


Ferritin    (10.0-291.0)  ng/mL


 


AST    (14-36)  U/L


 


Lactate Dehydrogenase    (313-618)  U/L


 


C-Reactive Protein    (<10.0)  mg/L


 


Albumin    (3.5-5.0)  g/dL














Thrombosis Risk Factor Assmnt





- Choose All That Apply


Each Factor Represents 1 point: Heart failure (<1month), Medical pt on bed rest,

 Obesity (BMI >25)


Each Risk Factor Represents 3 Points: Age 75 years or older


Thrombosis Risk Factor Assessment Total Risk Factor Score: 6


Thrombosis Risk Factor Assessment Level: High Risk





Assessment and Plan


Assessment: 





Acute bilateral covid pneumonia


Acute hypoxic respiratory failure needing intubation and mechanical ventilation


Increase inflammatory markers


Hypertension


Hyperlipidemia


COPD


Plan: 





This is a 75 years old female who presents with Covid pneumonia.  Continue with 

ICU management, continue with intubation and mechanical ventilation with 

management as per pulmonary/critical care team.  Continue with dexamethasone.  


Labs and medication were reviewed..  Continue same treatment.  Continue with 

symptomatic treatment.  Resume home medication.  Monitor lytes and vitals.  DVT 

and GI prophylaxis.  Further recommendationsas per clinical course of the 

patient


DVT prophylaxis: Lovenox subcutaneously


GI Prophylaxis: Ppi


Prognosis is very guarded


CODE STATUS: Changed to DO NOT RESUSCITATE  per Daughter request

## 2020-11-29 NOTE — XR
EXAMINATION TYPE: XR chest 1V portable

 

DATE OF EXAM: 11/29/2020

 

CLINICAL HISTORY: Difficulty breathing progress study.  

 

TECHNIQUE: Single AP portable semiupright view of the chest is obtained.

 

COMPARISON: Chest x-ray from one day earlier

 

FINDINGS:  Stable right sided central venous catheter, endotracheal, and orogastric tubes.

 

Persistent elevated right hemidiaphragm with increased opacities bilaterally. Some left-sided improve
ment felt present. Improved inspiration current study No pleural effusion or pneumothorax seen. Enlar
ged cardiomediastinal silhouette redemonstrated. Multilevel spurring thoracic spine noted.

 

IMPRESSION: Improved inspiration. Chronic parenchymal changes and cardiomegaly with bilateral multifo
jairo infiltrates and/or edema redemonstrated. Left upper lung findings felt improved from one day lisandro
ier.

## 2020-11-30 VITALS — HEART RATE: 84 BPM | SYSTOLIC BLOOD PRESSURE: 161 MMHG | DIASTOLIC BLOOD PRESSURE: 76 MMHG | RESPIRATION RATE: 53 BRPM

## 2020-11-30 VITALS — TEMPERATURE: 98.6 F

## 2020-11-30 LAB
ALBUMIN SERPL-MCNC: 2.9 G/DL (ref 3.5–5)
ALP SERPL-CCNC: 69 U/L (ref 38–126)
ALT SERPL-CCNC: 14 U/L (ref 4–34)
ANION GAP SERPL CALC-SCNC: 8 MMOL/L
AST SERPL-CCNC: 25 U/L (ref 14–36)
BASOPHILS # BLD AUTO: 0 K/UL (ref 0–0.2)
BASOPHILS NFR BLD AUTO: 0 %
BUN SERPL-SCNC: 36 MG/DL (ref 7–17)
CALCIUM SPEC-MCNC: 8.5 MG/DL (ref 8.4–10.2)
CHLORIDE SERPL-SCNC: 101 MMOL/L (ref 98–107)
CO2 BLDA-SCNC: 27 MMOL/L (ref 19–24)
CO2 SERPL-SCNC: 27 MMOL/L (ref 22–30)
EOSINOPHIL # BLD AUTO: 0 K/UL (ref 0–0.7)
EOSINOPHIL NFR BLD AUTO: 0 %
ERYTHROCYTE [DISTWIDTH] IN BLOOD BY AUTOMATED COUNT: 3.81 M/UL (ref 3.8–5.4)
ERYTHROCYTE [DISTWIDTH] IN BLOOD: 13.1 % (ref 11.5–15.5)
FERRITIN SERPL-MCNC: 381.9 NG/ML (ref 10–291)
GLUCOSE BLD-MCNC: 212 MG/DL (ref 75–99)
GLUCOSE BLD-MCNC: 271 MG/DL (ref 75–99)
GLUCOSE SERPL-MCNC: 271 MG/DL (ref 74–99)
HCO3 BLDA-SCNC: 26 MMOL/L (ref 21–25)
HCT VFR BLD AUTO: 34.1 % (ref 34–46)
HGB BLD-MCNC: 11.2 GM/DL (ref 11.4–16)
LDH SPEC-CCNC: 710 U/L (ref 313–618)
LYMPHOCYTES # SPEC AUTO: 0.6 K/UL (ref 1–4.8)
LYMPHOCYTES NFR SPEC AUTO: 9 %
MCH RBC QN AUTO: 29.3 PG (ref 25–35)
MCHC RBC AUTO-ENTMCNC: 32.8 G/DL (ref 31–37)
MCV RBC AUTO: 89.5 FL (ref 80–100)
MONOCYTES # BLD AUTO: 0.3 K/UL (ref 0–1)
MONOCYTES NFR BLD AUTO: 5 %
NEUTROPHILS # BLD AUTO: 5.3 K/UL (ref 1.3–7.7)
NEUTROPHILS NFR BLD AUTO: 85 %
PCO2 BLDA: 34 MMHG (ref 35–45)
PH BLDA: 7.5 [PH] (ref 7.35–7.45)
PLATELET # BLD AUTO: 221 K/UL (ref 150–450)
PO2 BLDA: 72 MMHG (ref 83–108)
POTASSIUM SERPL-SCNC: 4.1 MMOL/L (ref 3.5–5.1)
PROT SERPL-MCNC: 6 G/DL (ref 6.3–8.2)
SODIUM SERPL-SCNC: 136 MMOL/L (ref 137–145)
WBC # BLD AUTO: 6.2 K/UL (ref 3.8–10.6)

## 2020-11-30 RX ADMIN — PANTOPRAZOLE SODIUM SCH MG: 40 INJECTION, POWDER, FOR SOLUTION INTRAVENOUS at 09:08

## 2020-11-30 RX ADMIN — CHLORHEXIDINE GLUCONATE SCH ML: 1.2 RINSE ORAL at 09:08

## 2020-11-30 RX ADMIN — INSULIN ASPART SCH UNIT: 100 INJECTION, SOLUTION INTRAVENOUS; SUBCUTANEOUS at 00:31

## 2020-11-30 RX ADMIN — INSULIN ASPART SCH UNIT: 100 INJECTION, SOLUTION INTRAVENOUS; SUBCUTANEOUS at 06:17

## 2020-11-30 NOTE — XR
EXAMINATION TYPE: XR chest 1V portable

 

DATE OF EXAM: 11/30/2020

 

COMPARISON: 11/29/2020

 

HISTORY: Tube placement

 

TECHNIQUE: Single frontal view of the chest is obtained.

 

FINDINGS:  Right hemidiaphragm elevation. Multifocal bilateral areas of patchy infiltrate are noted. 
Thoracic aorta is somewhat prominent in appearance. Patient is rotated. ET tube, NG tube, and central
 line stable. No sizable pneumothorax.

 

IMPRESSION:  Bilateral pleural-parenchymal changes could represent diffuse pneumonia. Underlying CHF 
not excluded. Findings stable.

## 2020-11-30 NOTE — P.PN
Subjective





This is a pleasant 75 years old female who was transferred from [facility] for bilateral covid Pneumonia as her test came back positive she's also

with acute hypoxic respiratory failure initially she was on BiPAP but later on 

she needed intubation and placed on mechanical ventilation and was transferred 

to the intensive care unit under the care and close monitoring of the 

pulmonary/critical care team, patient could not provide information and were 

obtained from records


Patient currently is on dexamethasone and supportive care and medication


Patient status changed to DO NOT RESUSCITATE per daughter request labs and 

vitals are reviewed








11/29/2020


Patient remains in critical condition in the ICU, she is with Covid pneumonia 

needing mechanical ventilation.  Pulmonary/critical care team on the case.  Her 

chest x-ray showing bilateral pulmonary infiltrates consistent with Covid 19 

pneumonia.  There is mild increased inflammatory markers which was with her 

infection.  As per pulmonary team patient was made DO NOT RESUSCITATE.  Her 

prognosis is very poor








Review of systems: N/a


                               Active Medications











Generic Name Dose Route Start Last Admin





  Trade Name Freq  PRN Reason Stop Dose Admin


 


Morphine Sulfate 100 mg/  102 mls @ 1.02 mls/hr  11/30/20 14:30  11/30/20 16:00





  Sodium Chloride  IV   40 mg/hr





  .Q24H ELINA   40.8 mls/hr





    Titration





  Protocol  





  1 MG/HR  


 


Lorazepam  0.5 mg  11/28/20 02:05  11/30/20 12:50





  Lorazepam 2 Mg/Ml Inj  IV   0.5 mg





  Q4HR PRN   Administration





  Anxiety  


 


Morphine Sulfate  4 mg  11/30/20 12:19  11/30/20 12:23





  Morphine Sulfate 4 Mg/Ml Syringe  IVP   4 mg





  Q4HR PRN   Administration





  Pain  


 


Scopolamine  1 patch  11/30/20 14:30  11/30/20 15:08





  Scopolamine 1.5mg/72hr Patch  TRANSDERM   1 patch





  Q72H ELINA   Administration














Objective





- Vital Signs


Vital signs: 


                                   Vital Signs











Temp  97.3 F L  11/29/20 16:00


 


Pulse  63   11/29/20 17:00


 


Resp  24   11/29/20 17:00


 


BP  114/69   11/29/20 07:00


 


Pulse Ox  95   11/29/20 17:00








                                 Intake & Output











 11/28/20 11/29/20 11/29/20





 18:59 06:59 18:59


 


Intake Total 456.209 708.001 671.852


 


Output Total 375 362 505


 


Balance 81.209 346.001 166.852


 


Weight 110 kg 112.3 kg 


 


Intake:   


 


   240 220


 


    Sodium Chloride 0.9% 1, 220 240 220





    000 ml @ 20 mls/hr IV .   





    Q24H ELINA Rx#:[id number]   


 


  Intake, IV Titration 236.209 208.001 174.852





  Amount   


 


    Norepinephrine 8 mg In 34.659 10.840 7.359





    Sodium Chloride 0.9% 250   





    ml @ 0.05 MCG/KG/MIN 10.   





    643 mls/hr IV .Q24H ELINA   





    Rx#:[id number]   


 


    fentaNYL (PF) 1,000 mcg 1.55  





    In Sodium Chloride 0.9%   





    80 ml @ Per Protocol IV .   





    Q0M ELINA Rx#:[id number]   


 


    fentaNYL (PF) 1,000 mcg   84.333





    In Sodium Chloride 0.9%   





    80 ml @ Per Protocol IV .   





    Q0M ELINA Rx#:[id number]   


 


    propofoL 1,000 mg In 200 197.161 83.16





    Empty Bag 1 bag @ Titrate   





    IV .Q0M ELINA Rx#:   





    [id number]   


 


  Tube Feeding  170 187


 


  Other  90 90


 


Output:   


 


  Urine 375 362 505


 


Other:   


 


  Voiding Method Indwelling Catheter Indwelling Catheter Indwelling Catheter


 


  # Bowel Movements   0








                       ABP, PAP, CO, CI - Last Documented











Arterial Blood Pressure        98/57

















- Exam





-GENERAL: The patient is intubated and sedated


HEENT: Pupils are round and equally reacting to light. EOMI. No scleral icterus.

No conjunctival pallor. Normocephalic, atraumatic. No pharyngeal erythema. No t

hyromegaly. 


CARDIOVASCULAR: S1 and S2 present. No murmurs, rubs, or gallops. 


PULMONARY: Chest is clear to auscultation, no wheezing or crackles. 


ABDOMEN: Soft, nontender, nondistended, normoactive bowel sounds. No palpable 

organomegaly. 


MUSCULOSKELETAL: No joint swelling or deformity. 


EXTREMITIES: No cyanosis, clubbing, or pedal edema. 


NEUROLOGICAL: Gross neurological examination did not reveal any focal deficits. 


SKIN: No rashes. no petechiae.





- Labs


CBC & Chem 7: 


                                 11/30/20 04:10





                                 11/30/20 04:10


Labs: 


                  Abnormal Lab Results - Last 24 Hours (Table)











  11/28/20 11/28/20 11/29/20 Range/Units





  18:34 23:53 04:02 


 


Lymphocytes #    0.6 L  (1.0-4.8)  k/uL


 


ABG pH     (7.35-7.45)  


 


ABG pCO2     (35-45)  mmHg


 


ABG pO2     ()  mmHg


 


ABG HCO3     (21-25)  mmol/L


 


ABG Total CO2     (19-24)  mmol/L


 


Sodium     (137-145)  mmol/L


 


BUN     (7-17)  mg/dL


 


Glucose     (74-99)  mg/dL


 


POC Glucose (mg/dL)  250 H  210 H   (75-99)  mg/dL


 


Ferritin     (10.0-291.0)  ng/mL


 


Lactate Dehydrogenase     (313-618)  U/L


 


C-Reactive Protein     (<10.0)  mg/L


 


Albumin     (3.5-5.0)  g/dL














  11/29/20 11/29/20 11/29/20 Range/Units





  04:02 05:16 05:30 


 


Lymphocytes #     (1.0-4.8)  k/uL


 


ABG pH   7.51 H   (7.35-7.45)  


 


ABG pCO2   33 L   (35-45)  mmHg


 


ABG pO2   72 L   ()  mmHg


 


ABG HCO3   26 H   (21-25)  mmol/L


 


ABG Total CO2   27 H   (19-24)  mmol/L


 


Sodium  136 L    (137-145)  mmol/L


 


BUN  40 H    (7-17)  mg/dL


 


Glucose  243 H    (74-99)  mg/dL


 


POC Glucose (mg/dL)    243 H  (75-99)  mg/dL


 


Ferritin  635.6 H    (10.0-291.0)  ng/mL


 


Lactate Dehydrogenase  844 H    (313-618)  U/L


 


C-Reactive Protein  161.8 H    (<10.0)  mg/L


 


Albumin  3.1 L    (3.5-5.0)  g/dL














  11/29/20 Range/Units





  12:17 


 


Lymphocytes #   (1.0-4.8)  k/uL


 


ABG pH   (7.35-7.45)  


 


ABG pCO2   (35-45)  mmHg


 


ABG pO2   ()  mmHg


 


ABG HCO3   (21-25)  mmol/L


 


ABG Total CO2   (19-24)  mmol/L


 


Sodium   (137-145)  mmol/L


 


BUN   (7-17)  mg/dL


 


Glucose   (74-99)  mg/dL


 


POC Glucose (mg/dL)  254 H  (75-99)  mg/dL


 


Ferritin   (10.0-291.0)  ng/mL


 


Lactate Dehydrogenase   (313-618)  U/L


 


C-Reactive Protein   (<10.0)  mg/L


 


Albumin   (3.5-5.0)  g/dL








                      Microbiology - Last 24 Hours (Table)











 11/28/20 22:25 Gram Stain - Preliminary





 Sputum Sputum Culture - Preliminary














Assessment and Plan


Assessment: 





Acute bilateral covid pneumonia


Acute hypoxic respiratory failure needing intubation and mechanical ventilation


Increase inflammatory markers


Hypertension


Hyperlipidemia


COPD


Plan: 





This is a 75 years old female who presents with Covid pneumonia.  Continue with 

ICU management, continue with intubation and mechanical ventilation with 

management as per pulmonary/critical care team.  Continue with dexamethasone.  


Labs and medication were reviewed..  Continue same treatment.  Continue with 

symptomatic treatment.  Resume home medication.  Monitor lytes and vitals.  DVT 

and GI prophylaxis.  Further recommendationsas per clinical course of the 

patient


DVT prophylaxis: Lovenox subcutaneously


GI Prophylaxis: Ppi


Prognosis is very guarded


CODE STATUS: Changed to DO NOT RESUSCITATE  per Daughter request

## 2020-11-30 NOTE — P.PN
Subjective


Progress Note Date: 11/30/20


Principal diagnosis: 


 COVID 19 pneumonia





This is a 75-year-old female transferred last night from [facility] to

our ER with acute hypoxic respiratory failure, and bilateral pneumonia.  Patient

tested positive for covid 19, and her chest x-ray is classic for Covid 19 pne

umonitis.  Patient was initially on BiPAP, and her initial ABG was extremely 

poor.  Patient was admitted to the ICU, and she Removing her BiPAP.  Hence she 

was intubated and placed on mechanical ventilation.  I saw this patient this 

morning, and she is now on assist control mode of mechanical ventilation, rate 

of 2412 volume is 450 FiO2 is 60% and PEEP of 10.  ABG on 100% FiO2 showed a pO2

of 207 pCO2 of 37 pH of 7.46.  Patient is on propofol at 50 mcg/kg/m, she also 

on fentanyl at 0.5 mcg/kg per hour.  She received 1 dose of remdesivir at 

[facility], I was considering to continue the medication, however 

since her CODE STATUS has been changed, and the daughter is considering possible

comfort care measures, will hold further treatment with remdesivir.  Chest x-ray

showed evidence of diffuse interstitial infiltrates.





On 11/30/2020 patient seen in follow-up in intensive care unit, she remains 

sedated, intubated on mechanical ventilator, current vent settings are assist 

control with a rate of 24, tidal volume of 450, FiO2 50%, and PEEP of 5, and 

this morning blood gases showed pO2 of 72, pCO2 34, and pH of 7.50, IV drips 

include 0.907 at a rate of 20 ML per hour and a per min is a 50 mics per kilo 

per minute, she is on tube feedings with vital high-protein at a rate of 17 with

a goal of 17.  Patient has been afebrile overnight, hemodynamically she is 

stable, she received one-time dose of Remdesivir at that [facility].  

Today's chest x-ray shows bilateral pleural parenchymal changes representing 

diffuse pneumonia related to Covid 19 infection. 


CHF could not be excluded.  Today's labs have been reviewed including Levaquin, 

6.2, hemoglobin of 11.2, lymphopenia with a lymphocyte count of 0.6, 

electrolytes were unremarkable, B1 of 36 and creatinine 0.79.  LDH is trending 

down down to 710, CRP is down to 69.6 from 204.  Patient continues on IV 

steroids, prophylactic dose of Lovenox.  Her CODE STATUS is DO NOT RESUSCITATE, 

apparently this was not known before she got intubated in the emergency 

department, however when the family was contacted and they wanted to continue 

with supportive care and they went to continue ventilator support with a 

possibility of weaning and extubation today with the idea of not reintubating. 








Objective





- Vital Signs


Vital signs: 


                                   Vital Signs











Temp  98.6 F   11/30/20 08:00


 


Pulse  72   11/30/20 08:00


 


Resp  24   11/30/20 08:00


 


BP  121/81   11/30/20 08:00


 


Pulse Ox  97   11/30/20 08:00








                                 Intake & Output











 11/29/20 11/30/20 11/30/20





 18:59 06:59 18:59


 


Intake Total 865.423 768.923 287.998


 


Output Total 565 433 90


 


Balance 300.423 335.923 197.998


 


Weight  112.9 kg 112.9 kg


 


Intake:   


 


   253 92


 


    Pressure Bag  33 12


 


    Sodium Chloride 0.9% 1, 260 220 80





    000 ml @ 20 mls/hr IV .   





    Q24H ELINA Rx#:[id number]   


 


  Intake, IV Titration 294.423 238.923 97.998





  Amount   


 


    Norepinephrine 8 mg In 7.359 0.959 





    Sodium Chloride 0.9% 250   





    ml @ 0.05 MCG/KG/MIN 10.   





    643 mls/hr IV .Q24H ELINA   





    Rx#:[id number]   


 


    fentaNYL (PF) 1,000 mcg 103.904  





    In Sodium Chloride 0.9%   





    80 ml @ Per Protocol IV .   





    Q0M ELINA Rx#:[id number]   


 


    propofoL 1,000 mg In 183.16 237.964 97.998





    Empty Bag 1 bag @ Titrate   





    IV .Q0M ELINA Rx#:   





    [id number]   


 


  Tube Feeding 221 187 68


 


  Other 90 90 30


 


Output:   


 


  Urine 565 433 90


 


Other:   


 


  Voiding Method Indwelling Catheter Indwelling Catheter 


 


  # Bowel Movements 0  0








                       ABP, PAP, CO, CI - Last Documented











Arterial Blood Pressure        115/61

















- Exam


 GENERAL EXAM: Sedated, 75-year-old white female on assist-control mode of 

ventilation with FiO2 of 50%, comfortable in no apparent distress.


HEAD: Normocephalic/atraumatic.


EYES: Normal reaction of pupils, equal size.  Conjunctiva pink, sclera white.


NOSE: Clear with pink turbinates.


THROAT: No erythema or exudates.


NECK: No masses, no JVD, no thyroid enlargement, no adenopathy.


CHEST: No chest wall deformity.  Symmetrical expansion. 


LUNGS: Equal air entry with no crackles, wheeze, rhonchi or dullness.


CVS: Regular rate and rhythm, normal S1 and S2, no gallops, no murmurs, no rubs


ABDOMEN: Soft, nontender.  No hepatosplenomegaly, normal bowel sounds, no 

guarding or rigidity.


EXTREMITIES: No clubbing, no edema, no cyanosis, 2+ pulses and upper and lower 

extremities.


MUSCULOSKELETAL: Muscle strength and tone normal.


SPINE: No scoliosis or deformity


SKIN: No rashes


CENTRAL NERVOUS SYSTEM: Sedated and intubated  No focal deficits, tone is normal

in all 4 extremities.











- Labs


CBC & Chem 7: 


                                 11/30/20 04:10





                                 11/30/20 04:10


Labs: 


                  Abnormal Lab Results - Last 24 Hours (Table)











  11/29/20 11/30/20 11/30/20 Range/Units





  17:33 00:13 04:10 


 


Hgb    11.2 L  (11.4-16.0)  gm/dL


 


Lymphocytes #    0.6 L  (1.0-4.8)  k/uL


 


ABG pH     (7.35-7.45)  


 


ABG pCO2     (35-45)  mmHg


 


ABG pO2     ()  mmHg


 


ABG HCO3     (21-25)  mmol/L


 


ABG Total CO2     (19-24)  mmol/L


 


Sodium     (137-145)  mmol/L


 


BUN     (7-17)  mg/dL


 


Glucose     (74-99)  mg/dL


 


POC Glucose (mg/dL)  193 H  212 H   (75-99)  mg/dL


 


Ferritin     (10.0-291.0)  ng/mL


 


Lactate Dehydrogenase     (313-618)  U/L


 


C-Reactive Protein     (<10.0)  mg/L


 


Total Protein     (6.3-8.2)  g/dL


 


Albumin     (3.5-5.0)  g/dL














  11/30/20 11/30/20 11/30/20 Range/Units





  04:10 04:30 06:05 


 


Hgb     (11.4-16.0)  gm/dL


 


Lymphocytes #     (1.0-4.8)  k/uL


 


ABG pH   7.50 H   (7.35-7.45)  


 


ABG pCO2   34 L   (35-45)  mmHg


 


ABG pO2   72 L   ()  mmHg


 


ABG HCO3   26 H   (21-25)  mmol/L


 


ABG Total CO2   27 H   (19-24)  mmol/L


 


Sodium  136 L    (137-145)  mmol/L


 


BUN  36 H    (7-17)  mg/dL


 


Glucose  271 H    (74-99)  mg/dL


 


POC Glucose (mg/dL)    271 H  (75-99)  mg/dL


 


Ferritin  381.9 H    (10.0-291.0)  ng/mL


 


Lactate Dehydrogenase  710 H    (313-618)  U/L


 


C-Reactive Protein  69.6 H    (<10.0)  mg/L


 


Total Protein  6.0 L    (6.3-8.2)  g/dL


 


Albumin  2.9 L    (3.5-5.0)  g/dL








                      Microbiology - Last 24 Hours (Table)











 11/28/20 22:25 Gram Stain - Preliminary





 Sputum Sputum Culture - Preliminary





    Gram Neg Bacilli














Assessment and Plan


Plan: 


 Assessment:





Acute hypoxic respiratory failure secondary to Covid 19 pneumonitis.


History of benign essential hypertension.


History of dyslipidemia.


History of underlying COPD, severity of which is not clear.





Plan:





Patient was given a spontaneous awaking and spontaneous breathing trial, she was

extubated to high flow oxygen, however she very quickly started having increased

difficulty breathing, very agitated, restless, requiring IV morphine, she was 

given 40 of Lasix with extubation, the family was updated, overall prognosis is 

poor, patient will continue on IV steroids, however the patient's family was 

updated and they decided to go ahead and start comfort care protocol.  Patient 

will be given IV morphine, and comfort care protocol will be initiated per 

family wishes. 





I performed a history & physical examination of the patient and discussed their 

management with my nurse practitioner, Gini Ford.  I reviewed the nurse 

practitioner's note and agree with the documented findings and plan of care.  

Lung sounds are positive for diffuse crackles  The findings and the impression 

was discussed with the patient.  I attest to the documentation by the nurse 

practitioner. 





Time with Patient: Greater than 30

## 2020-11-30 NOTE — P.PN
Subjective





This is a pleasant 75 years old female who was transferred from [facility] for bilateral covid Pneumonia as her test came back positive she's also

with acute hypoxic respiratory failure initially she was on BiPAP but later on 

she needed intubation and placed on mechanical ventilation and was transferred 

to the intensive care unit under the care and close monitoring of the 

pulmonary/critical care team, patient could not provide information and were 

obtained from records


Patient currently is on dexamethasone and supportive care and medication


Patient status changed to DO NOT RESUSCITATE per daughter request labs and 

vitals are reviewed








11/29/2020


Patient remains in critical condition in the ICU, she is with Covid pneumonia 

needing mechanical ventilation.  Pulmonary/critical care team on the case.  Her 

chest x-ray showing bilateral pulmonary infiltrates consistent with Covid 19 

pneumonia.  There is mild increased inflammatory markers which was with her 

infection.  As per pulmonary team patient was made DO NOT RESUSCITATE.  Her 

prognosis is very poor








11/03/2020


Patient remains in the ICU.  As per discussion of the family with the 

pulmonary/critical care team she was extubated, went into immediate respiratory.

 Family were contacted by pulmonary team and patient was made comfort care





Review of systems: N/a


                               Active Medications











Generic Name Dose Route Start Last Admin





  Trade Name Freq  PRN Reason Stop Dose Admin


 


Morphine Sulfate 100 mg/  102 mls @ 1.02 mls/hr  11/30/20 14:30  11/30/20 16:00





  Sodium Chloride  IV   40 mg/hr





  .Q24H ELINA   40.8 mls/hr





    Titration





  Protocol  





  1 MG/HR  


 


Lorazepam  0.5 mg  11/28/20 02:05  11/30/20 12:50





  Lorazepam 2 Mg/Ml Inj  IV   0.5 mg





  Q4HR PRN   Administration





  Anxiety  


 


Morphine Sulfate  4 mg  11/30/20 12:19  11/30/20 12:23





  Morphine Sulfate 4 Mg/Ml Syringe  IVP   4 mg





  Q4HR PRN   Administration





  Pain  


 


Scopolamine  1 patch  11/30/20 14:30  11/30/20 15:08





  Scopolamine 1.5mg/72hr Patch  TRANSDERM   1 patch





  Q72H ELINA   Administration














Objective





- Vital Signs


Vital signs: 


                                   Vital Signs











Temp  98.6 F   11/30/20 08:00


 


Pulse  84   11/30/20 15:00


 


Resp  53 H  11/30/20 15:00


 


BP  161/76   11/30/20 15:00


 


Pulse Ox  86 L  11/30/20 15:00








                                 Intake & Output











 11/30/20 11/30/20 12/01/20





 06:59 18:59 06:59


 


Intake Total 768.923 445.694 


 


Output Total 433 790 


 


Balance 335.923 -344.306 


 


Weight 112.9 kg 112.9 kg 


 


Intake:   


 


   207 


 


    Pressure Bag 33 27 


 


    Sodium Chloride 0.9% 1, 220 180 





    000 ml @ 20 mls/hr IV .   





    Q24H ELINA Rx#:[id number]   


 


  Intake, IV Titration 238.923 140.694 





  Amount   


 


    Morphine Sulfate (100 mg/  11.084 





    2 ml) 100 mg In Sodium   





    Chloride 0.9% 100 ml @ 1   





    MG/HR 1.02 mls/hr IV .   





    Q24H ELINA Rx#:[id number]   


 


    Norepinephrine 8 mg In 0.959  





    Sodium Chloride 0.9% 250   





    ml @ 0.05 MCG/KG/MIN 10.   





    643 mls/hr IV .Q24H ELINA   





    Rx#:[id number]   


 


    propofoL 1,000 mg In 237.964 129.610 





    Empty Bag 1 bag @ Titrate   





    IV .Q0M ELINA Rx#:   





    [id number]   


 


  Tube Feeding 187 68 


 


  Other 90 30 


 


Output:   


 


  Urine 433 790 


 


Other:   


 


  Voiding Method Indwelling Catheter Indwelling Catheter 


 


  # Bowel Movements  0 








                       ABP, PAP, CO, CI - Last Documented











Arterial Blood Pressure        115/61

















- Exam





-GENERAL: The patient is resting comfortable, obtunded


HEENT: Pupils are round and equally reacting to light. EOMI. No scleral icterus.

No conjunctival pallor. Normocephalic, atraumatic. No pharyngeal erythema. No 

thyromegaly. 


CARDIOVASCULAR: S1 and S2 present. No murmurs, rubs, or gallops. 


-PULMONARY: Tachypnea with bilateral crepitation 


ABDOMEN: Soft, nontender, nondistended, normoactive bowel sounds. No palpable 

organomegaly. 


MUSCULOSKELETAL: No joint swelling or deformity. 


EXTREMITIES: No cyanosis, clubbing, or pedal edema. 


NEUROLOGICAL: Gross neurological examination did not reveal any focal deficits. 


SKIN: No rashes. no petechiae.





- Labs


CBC & Chem 7: 


                                 11/30/20 04:10





                                 11/30/20 04:10


Labs: 


                  Abnormal Lab Results - Last 24 Hours (Table)











  11/30/20 11/30/20 11/30/20 Range/Units





  00:13 04:10 04:10 


 


Hgb   11.2 L   (11.4-16.0)  gm/dL


 


Lymphocytes #   0.6 L   (1.0-4.8)  k/uL


 


ABG pH     (7.35-7.45)  


 


ABG pCO2     (35-45)  mmHg


 


ABG pO2     ()  mmHg


 


ABG HCO3     (21-25)  mmol/L


 


ABG Total CO2     (19-24)  mmol/L


 


Sodium    136 L  (137-145)  mmol/L


 


BUN    36 H  (7-17)  mg/dL


 


Glucose    271 H  (74-99)  mg/dL


 


POC Glucose (mg/dL)  212 H    (75-99)  mg/dL


 


Ferritin    381.9 H  (10.0-291.0)  ng/mL


 


Lactate Dehydrogenase    710 H  (313-618)  U/L


 


C-Reactive Protein    69.6 H  (<10.0)  mg/L


 


Total Protein    6.0 L  (6.3-8.2)  g/dL


 


Albumin    2.9 L  (3.5-5.0)  g/dL














  11/30/20 11/30/20 Range/Units





  04:30 06:05 


 


Hgb    (11.4-16.0)  gm/dL


 


Lymphocytes #    (1.0-4.8)  k/uL


 


ABG pH  7.50 H   (7.35-7.45)  


 


ABG pCO2  34 L   (35-45)  mmHg


 


ABG pO2  72 L   ()  mmHg


 


ABG HCO3  26 H   (21-25)  mmol/L


 


ABG Total CO2  27 H   (19-24)  mmol/L


 


Sodium    (137-145)  mmol/L


 


BUN    (7-17)  mg/dL


 


Glucose    (74-99)  mg/dL


 


POC Glucose (mg/dL)   271 H  (75-99)  mg/dL


 


Ferritin    (10.0-291.0)  ng/mL


 


Lactate Dehydrogenase    (313-618)  U/L


 


C-Reactive Protein    (<10.0)  mg/L


 


Total Protein    (6.3-8.2)  g/dL


 


Albumin    (3.5-5.0)  g/dL








                      Microbiology - Last 24 Hours (Table)











 11/28/20 22:25 Gram Stain - Preliminary





 Sputum Sputum Culture - Preliminary





    Gram Neg Bacilli














Assessment and Plan


Assessment: 





Acute bilateral covid pneumonia


Acute hypoxic respiratory failure needing intubation and mechanical ventilation


Increase inflammatory markers


Hypertension


Hyperlipidemia


COPD


Plan: 





This is a 75 years old female who presents with Covid pneumonia.  Patient was 

extubated by pulmonary team, she was made comfort care by family request


Continue with comfort care measures


Prognosis is very guarded


CODE STATUS: Changed to DO NOT RESUSCITATE  per Daughter request

## 2020-12-01 NOTE — P.DS
Providers


Date of admission: 


20 19:14





Attending physician: 


Tony Felix MD





Primary care physician: 


Jayro Wolf





Hospital Course: 





diagnoses :


Acute bilateral covid pneumonia


Acute hypoxic respiratory failure needing intubation and mechanical ventilation


Increase inflammatory markers


Hypertension


Hyperlipidemia


COPD





hospital course


this is a 74 yo F who presents with covid pneumonia ,admitted to the icu, she 

was made comfort care by family with the critical care team and eventually she 

passed away last night 


please see chart for more details , and nurse notes


Patient Condition at Discharge: Critical





Plan - Discharge Summary


Discharge Rx Participant: Yes


New Discharge Prescriptions: 


No Action


   Magnesium Hydroxide [Milk of Magnesia] 2,400 mg PO Q48H PRN


     PRN Reason: Constipation


   Acetaminophen [Tylenol] 650 mg PO Q6H PRN


     PRN Reason: Fever


   LORazepam [Ativan] 1 mg PO Q2H PRN


     PRN Reason: Anxiety


   Diphenox-Atrop 2.5-0.025 mg [Lomotil] 1 tab PO QID PRN


     PRN Reason: Diarrhea


   HYDROcodone/APAP 5-325MG [Norco 5-325] 1 tab PO QID


   Gabapentin [Neurontin] 100 mg PO BID


   Carvedilol [Coreg] 12.5 mg PO BID


   Artificial Tears-Hypromellose [Artificial Tear Drops] 1 drops BOTH EYES BID


   Azithromycin [Zithromax Z-pack (6 tabs)] See Taper PO AS DIRECTED


   Zinc 50 mg PO DAILY


   Cholecalciferol [Vitamin D3 (25 Mcg = 1000 Iu)] 5,000 unit PO DAILY


   Ascorbic Acid [Vitamin C] 500 mg PO DAILY


   Sennosides-Docusate Sodium [Senokot-S] 1 tab PO HS


   Pantoprazole Sodium [Protonix] 20 mg PO DAILY


   Mirtazapine [Remeron] 15 mg PO HS


   lisinopriL [Zestril] 2.5 mg PO DAILY


   Potassium Chloride ER [K-Dur 10] 10 meq PO DAILY


   Levothyroxine Sodium [Synthroid] 50 mcg PO DAILY


   Furosemide [Lasix] 20 mg PO DAILY


   Dexamethasone 6 mg PO DAILY


   DULoxetine HCL [Cymbalta] 60 mg PO DAILY


   Atorvastatin [Lipitor] 10 mg PO HS


   Aspirin [Adult Low Dose Aspirin EC] 81 mg PO DAILY


Discharge Medication List





Acetaminophen [Tylenol] 650 mg PO Q6H PRN 20 [History]


Artificial Tears-Hypromellose [Artificial Tear Drops] 1 drops BOTH EYES BID 

20 [History]


Ascorbic Acid [Vitamin C] 500 mg PO DAILY 20 [History]


Aspirin [Adult Low Dose Aspirin EC] 81 mg PO DAILY 20 [History]


Atorvastatin [Lipitor] 10 mg PO HS 20 [History]


Azithromycin [Zithromax Z-pack (6 tabs)] See Taper PO AS DIRECTED 20 

[History]


Carvedilol [Coreg] 12.5 mg PO BID 20 [History]


Cholecalciferol [Vitamin D3 (25 Mcg = 1000 Iu)] 5,000 unit PO DAILY 20 

[History]


DULoxetine HCL [Cymbalta] 60 mg PO DAILY 20 [History]


Dexamethasone 6 mg PO DAILY 20 [History]


Diphenox-Atrop 2.5-0.025 mg [Lomotil] 1 tab PO QID PRN 20 [History]


Furosemide [Lasix] 20 mg PO DAILY 20 [History]


Gabapentin [Neurontin] 100 mg PO BID 20 [History]


HYDROcodone/APAP 5-325MG [Norco 5-325] 1 tab PO QID 20 [History]


LORazepam [Ativan] 1 mg PO Q2H PRN 20 [History]


Levothyroxine Sodium [Synthroid] 50 mcg PO DAILY 20 [History]


Magnesium Hydroxide [Milk of Magnesia] 2,400 mg PO Q48H PRN 20 [History]


Mirtazapine [Remeron] 15 mg PO HS 20 [History]


Pantoprazole Sodium [Protonix] 20 mg PO DAILY 20 [History]


Potassium Chloride ER [K-Dur 10] 10 meq PO DAILY 20 [History]


Sennosides-Docusate Sodium [Senokot-S] 1 tab PO HS 20 [History]


Zinc 50 mg PO DAILY 20 [History]


lisinopriL [Zestril] 2.5 mg PO DAILY 20 [History]








Follow up Appointment(s)/Referral(s): 


Nonstaff,Physician [REFERRING] - 1-2 days


Discharge Disposition: 





- Preliminary Cause of Death


Preliminary Cause of Death: covid pneumonia

## 2020-12-03 NOTE — CDI
Documentation Clarification Form



Date: 12/3/2020

From: Anca Pichardo RN, CCDS

Email: [email or web address] 

MRN: [id number]

Admit Date: 11/27/2020 07:14:00 PM

Patient Name: Renee Fuchs

Visit Number: [id number]

Discharge Date:  11/30/2020 09:06:00 PM





ATTENTION: The Clinical Documentation Specialists (CDI) and [facility] Coding Staff 
appreciate your assistance in clarifying documentation. Please respond to the 
clarification below the line at the bottom and electronically sign. The CDI & 
[facility] Coding staff will review the response and follow-up if needed. Please note: 
Queries are made part of the Legal Health Record. If you have any questions, 
please contact the author of this message via ITS.



Dr. Tony Felix



Chest x-ray shows CHF and possible right lower lobe pneumonia and right pleural 
effusion is documented in the 11/27 ED note.  



History/Risk Factors: COPD, HTN, morbid obesity, hyperlipidemia, Covid+, acute 
respiratory failure

Clinical Indicators: SOB, difficulty breathing. Lungs with crackles and rhonchi 
noted bilaterally on 11/29 11/30 Pulmonary note: 'Today's chest x-ray shows bilateral pleural parenchymal 
changes representing diffuse pneumonia related to Covid 19 infection. CHF could 
not be excluded' 

VS/Pulse OX: 11/27 pox 88%. 11/28 BP 70/53. 11/29 HR 53. 11/30 . RR 24-53



11/27 CXR: Congestive heart failure. There is probably also right lower lobe 
pneumonia. Right pleural effusion.

11/28 CXR: New right central venous catheter terminates in right atrium. No 
pneumothorax noted. Persistent low lung volumes and cardiomegaly with bilateral 
multifocal edema and/or infiltrates.

11/29 CXR: Chronic parenchymal changes and cardiomegaly with bilateral 
multifocal infiltrates and/or edema redemonstrated

11/30 CXR: Bilateral pleural-parenchymal changes could represent diffuse 
pneumonia. Underlying CHF not excluded.



Treatment: IV Lasix 40mg on 11/30. Intubated and ventilated



In your professional opinion, can you please clarify the acuity and type of CHF 
if known?



Systolic Heart Failure:

 Acute 

 Chronic

 Acute on Chronic  



Diastolic Heart Failure:

 Acute 

 Chronic

 Acute on Chronic



Systolic & Diastolic Heart Failure:

 Acute 

 Chronic

 Acute on Chronic Heart Failure



Unable to Determine

Other, please specify________________

Unable to Determine

MTDD